# Patient Record
Sex: MALE | Race: WHITE | NOT HISPANIC OR LATINO | Employment: FULL TIME | URBAN - METROPOLITAN AREA
[De-identification: names, ages, dates, MRNs, and addresses within clinical notes are randomized per-mention and may not be internally consistent; named-entity substitution may affect disease eponyms.]

---

## 2023-12-02 ENCOUNTER — APPOINTMENT (OUTPATIENT)
Dept: RADIOLOGY | Facility: HOSPITAL | Age: 43
End: 2023-12-02
Payer: COMMERCIAL

## 2023-12-02 ENCOUNTER — HOSPITAL ENCOUNTER (EMERGENCY)
Facility: HOSPITAL | Age: 43
Discharge: HOME | End: 2023-12-02
Payer: COMMERCIAL

## 2023-12-02 VITALS
BODY MASS INDEX: 35.78 KG/M2 | SYSTOLIC BLOOD PRESSURE: 171 MMHG | HEIGHT: 73 IN | HEART RATE: 99 BPM | OXYGEN SATURATION: 96 % | TEMPERATURE: 98.1 F | RESPIRATION RATE: 16 BRPM | WEIGHT: 270 LBS | DIASTOLIC BLOOD PRESSURE: 101 MMHG

## 2023-12-02 DIAGNOSIS — R10.31 GROIN PAIN, RIGHT: Primary | ICD-10-CM

## 2023-12-02 LAB
APPEARANCE UR: CLEAR
BILIRUB UR STRIP.AUTO-MCNC: NEGATIVE MG/DL
COLOR UR: ABNORMAL
GLUCOSE UR STRIP.AUTO-MCNC: ABNORMAL MG/DL
KETONES UR STRIP.AUTO-MCNC: NEGATIVE MG/DL
LEUKOCYTE ESTERASE UR QL STRIP.AUTO: NEGATIVE
NITRITE UR QL STRIP.AUTO: NEGATIVE
PH UR STRIP.AUTO: 5 [PH]
PROT UR STRIP.AUTO-MCNC: ABNORMAL MG/DL
RBC # UR STRIP.AUTO: NEGATIVE /UL
RBC #/AREA URNS AUTO: NORMAL /HPF
SP GR UR STRIP.AUTO: 1.03
UROBILINOGEN UR STRIP.AUTO-MCNC: <2 MG/DL
WBC #/AREA URNS AUTO: NORMAL /HPF

## 2023-12-02 PROCEDURE — 81001 URINALYSIS AUTO W/SCOPE: CPT | Performed by: NURSE PRACTITIONER

## 2023-12-02 PROCEDURE — 94760 N-INVAS EAR/PLS OXIMETRY 1: CPT

## 2023-12-02 PROCEDURE — 93971 EXTREMITY STUDY: CPT | Mod: FR

## 2023-12-02 PROCEDURE — 99284 EMERGENCY DEPT VISIT MOD MDM: CPT | Mod: 25 | Performed by: NURSE PRACTITIONER

## 2023-12-02 PROCEDURE — 73502 X-RAY EXAM HIP UNI 2-3 VIEWS: CPT | Mod: RIGHT SIDE | Performed by: RADIOLOGY

## 2023-12-02 PROCEDURE — 73502 X-RAY EXAM HIP UNI 2-3 VIEWS: CPT | Mod: RT,FY,FR

## 2023-12-02 PROCEDURE — 93971 EXTREMITY STUDY: CPT | Mod: FOREIGN READ | Performed by: RADIOLOGY

## 2023-12-02 PROCEDURE — 99283 EMERGENCY DEPT VISIT LOW MDM: CPT

## 2023-12-02 RX ORDER — IBUPROFEN 600 MG/1
600 TABLET ORAL EVERY 8 HOURS PRN
Qty: 30 TABLET | Refills: 0 | Status: SHIPPED | OUTPATIENT
Start: 2023-12-02

## 2023-12-02 ASSESSMENT — COLUMBIA-SUICIDE SEVERITY RATING SCALE - C-SSRS
2. HAVE YOU ACTUALLY HAD ANY THOUGHTS OF KILLING YOURSELF?: NO
6. HAVE YOU EVER DONE ANYTHING, STARTED TO DO ANYTHING, OR PREPARED TO DO ANYTHING TO END YOUR LIFE?: NO
1. IN THE PAST MONTH, HAVE YOU WISHED YOU WERE DEAD OR WISHED YOU COULD GO TO SLEEP AND NOT WAKE UP?: NO

## 2023-12-02 ASSESSMENT — PAIN DESCRIPTION - PAIN TYPE: TYPE: ACUTE PAIN

## 2023-12-02 ASSESSMENT — PAIN DESCRIPTION - LOCATION: LOCATION: LEG

## 2023-12-02 ASSESSMENT — PAIN DESCRIPTION - DIRECTION: RADIATING_TOWARDS: GROIN

## 2023-12-02 ASSESSMENT — PAIN - FUNCTIONAL ASSESSMENT: PAIN_FUNCTIONAL_ASSESSMENT: 0-10

## 2023-12-02 ASSESSMENT — PAIN DESCRIPTION - ORIENTATION: ORIENTATION: RIGHT

## 2023-12-02 ASSESSMENT — PAIN DESCRIPTION - ONSET: ONSET: ONGOING

## 2023-12-02 ASSESSMENT — PAIN DESCRIPTION - DESCRIPTORS
DESCRIPTORS: SHARP;SHOOTING
DESCRIPTORS: SHARP

## 2023-12-02 ASSESSMENT — PAIN SCALES - GENERAL: PAINLEVEL_OUTOF10: 8

## 2023-12-02 ASSESSMENT — PAIN DESCRIPTION - FREQUENCY: FREQUENCY: INTERMITTENT

## 2023-12-02 NOTE — ED PROVIDER NOTES
HPI   Chief Complaint   Patient presents with    Leg Pain     Leg/groin pain       This is a 42-year-old  male presenting to the emergency room complaints of right groin pain for the past 3 days.  The patient denies any traumatic injury.  He states that the pain is worse when he is standing or sitting upright.  The pain is better when he is laying flat.  He has not noticed any obvious hernia or bulges in the groin region.  Patient is not having any urinary symptoms.  He denies any rashes or lesions.  He denies any history of known STD.  The patient does not have any history of PE, DVT, recent travel, recent hospitalization, cancer, or surgery.  The patient does not smoke.  He did not take any medication prior to arrival for pain.  He is not experiencing any paresthesias to the extremity.  He is not having any swelling of the leg.  No obvious wounds or infectious process.      History provided by:  Patient   used: No                        [place] Coma Scale Score: 15                  Patient History   Past Medical History:   Diagnosis Date    Personal history of other diseases of the circulatory system     History of hypertension    Personal history of other endocrine, nutritional and metabolic disease     History of hyperlipidemia    Personal history of other endocrine, nutritional and metabolic disease     History of type 2 diabetes mellitus     Past Surgical History:   Procedure Laterality Date    OTHER SURGICAL HISTORY  04/06/2017    Lung Lobectomy Partial     No family history on file.  Social History     Tobacco Use    Smoking status: Not on file    Smokeless tobacco: Not on file   Substance Use Topics    Alcohol use: Not on file    Drug use: Not on file       Physical Exam   ED Triage Vitals [12/02/23 1708]   Temp Heart Rate Resp BP   36.7 °C (98.1 °F) 80 16 --      SpO2 Temp Source Heart Rate Source Patient Position   100 % Tympanic Monitor --      BP Location FiO2 (%)     -- --        Physical Exam  Vitals and nursing note reviewed.   Constitutional:       Appearance: He is normal weight.   HENT:      Head: Normocephalic.      Right Ear: External ear normal.      Left Ear: External ear normal.      Nose: Nose normal.      Mouth/Throat:      Pharynx: Oropharynx is clear.   Eyes:      Conjunctiva/sclera: Conjunctivae normal.   Cardiovascular:      Rate and Rhythm: Normal rate and regular rhythm.      Pulses: Normal pulses.      Heart sounds: Normal heart sounds.   Pulmonary:      Effort: Pulmonary effort is normal.      Breath sounds: Normal breath sounds.   Abdominal:      General: Bowel sounds are normal. There is no distension.      Palpations: Abdomen is soft.      Tenderness: There is no abdominal tenderness. There is no right CVA tenderness, left CVA tenderness or rebound.   Genitourinary:     Comments: The patient has no rashes, lesions, ulcerations noted to the external genitalia.  The patient is a cremasterics male with no obvious palpable bulge in the right inguinal canal.  Musculoskeletal:      Comments: The patient is able to move all extremities with normal range of motion and muscle strength.  The patient does not have any reproducible pain with the hip joint.  The leg is not shortened or rotated.  Distal extremities with brisk cap refill and sensation intact.  No calf pain.   Neurological:      Mental Status: He is alert.         ED Course & MDM   Diagnoses as of 12/03/23 0020   Groin pain, right       Medical Decision Making  Patient was seen and evaluated by the nurse practitioner, Maliha Arredondo.  The patient is presenting to the emergency room with complaints of right groin pain.  The patient was examined and does not have any obvious inguinal defect.  He does not have any obvious signs of trauma to the right hip.  There is no shortening or rotation of the leg.  The patient is able to ambulate.  He is able to move the leg without difficulties.  An x-ray of the hip was  obtained and showed degenerative changes with no acute fracture or subluxation.  An ultrasound of the right lower extremity was performed and was negative for DVT.  Routine urinalysis was obtained and was negative for infection.  We believe the patient is most likely suffering from a groin strain.  The patient was provided prescription for ibuprofen for home administration.  He was educated on rice therapy.  The patient is to follow up with their primary care physician in the next 2-3 days.  The patient is to return to the ED worse in any way.  The patient was discharged in stable condition with computer discharge instructions given. Patient was agreeable with discharge planning.        Procedure  Procedures     KOFI Ramirez-TALITA  12/03/23 0022

## 2024-07-08 ENCOUNTER — HOSPITAL ENCOUNTER (OUTPATIENT)
Facility: HOSPITAL | Age: 44
Setting detail: OBSERVATION
Discharge: HOME | End: 2024-07-09
Attending: EMERGENCY MEDICINE | Admitting: STUDENT IN AN ORGANIZED HEALTH CARE EDUCATION/TRAINING PROGRAM
Payer: COMMERCIAL

## 2024-07-08 ENCOUNTER — APPOINTMENT (OUTPATIENT)
Dept: CARDIOLOGY | Facility: HOSPITAL | Age: 44
End: 2024-07-08
Payer: COMMERCIAL

## 2024-07-08 ENCOUNTER — APPOINTMENT (OUTPATIENT)
Dept: RADIOLOGY | Facility: HOSPITAL | Age: 44
End: 2024-07-08
Payer: COMMERCIAL

## 2024-07-08 DIAGNOSIS — K29.80 DUODENITIS: ICD-10-CM

## 2024-07-08 DIAGNOSIS — Z79.4 TYPE 2 DIABETES MELLITUS WITH DIABETIC NEUROPATHY, WITH LONG-TERM CURRENT USE OF INSULIN (MULTI): ICD-10-CM

## 2024-07-08 DIAGNOSIS — R73.9 HYPERGLYCEMIA: Primary | ICD-10-CM

## 2024-07-08 DIAGNOSIS — E11.40 TYPE 2 DIABETES MELLITUS WITH DIABETIC NEUROPATHY, WITH LONG-TERM CURRENT USE OF INSULIN (MULTI): ICD-10-CM

## 2024-07-08 LAB
ALBUMIN SERPL BCP-MCNC: 4 G/DL (ref 3.4–5)
ALP SERPL-CCNC: 112 U/L (ref 33–120)
ALT SERPL W P-5'-P-CCNC: 11 U/L (ref 10–52)
ANION GAP SERPL CALC-SCNC: 13 MMOL/L (ref 10–20)
APPEARANCE UR: CLEAR
AST SERPL W P-5'-P-CCNC: 9 U/L (ref 9–39)
BASOPHILS # BLD AUTO: 0.03 X10*3/UL (ref 0–0.1)
BASOPHILS NFR BLD AUTO: 0.4 %
BILIRUB SERPL-MCNC: 0.4 MG/DL (ref 0–1.2)
BILIRUB UR STRIP.AUTO-MCNC: NEGATIVE MG/DL
BUN SERPL-MCNC: 14 MG/DL (ref 6–23)
CALCIUM SERPL-MCNC: 9.1 MG/DL (ref 8.6–10.3)
CARDIAC TROPONIN I PNL SERPL HS: 5 NG/L (ref 0–20)
CHLORIDE SERPL-SCNC: 93 MMOL/L (ref 98–107)
CO2 SERPL-SCNC: 26 MMOL/L (ref 21–32)
COLOR UR: COLORLESS
CREAT SERPL-MCNC: 1.07 MG/DL (ref 0.5–1.3)
EGFRCR SERPLBLD CKD-EPI 2021: 88 ML/MIN/1.73M*2
EOSINOPHIL # BLD AUTO: 0.04 X10*3/UL (ref 0–0.7)
EOSINOPHIL NFR BLD AUTO: 0.5 %
ERYTHROCYTE [DISTWIDTH] IN BLOOD BY AUTOMATED COUNT: 12.3 % (ref 11.5–14.5)
GLUCOSE BLD MANUAL STRIP-MCNC: 375 MG/DL (ref 74–99)
GLUCOSE SERPL-MCNC: 474 MG/DL (ref 74–99)
GLUCOSE UR STRIP.AUTO-MCNC: ABNORMAL MG/DL
HCT VFR BLD AUTO: 41 % (ref 41–52)
HGB BLD-MCNC: 14.6 G/DL (ref 13.5–17.5)
IMM GRANULOCYTES # BLD AUTO: 0.02 X10*3/UL (ref 0–0.7)
IMM GRANULOCYTES NFR BLD AUTO: 0.3 % (ref 0–0.9)
INR PPP: 1 (ref 0.9–1.1)
KETONES UR STRIP.AUTO-MCNC: NEGATIVE MG/DL
LEUKOCYTE ESTERASE UR QL STRIP.AUTO: NEGATIVE
LIPASE SERPL-CCNC: 63 U/L (ref 9–82)
LYMPHOCYTES # BLD AUTO: 1.51 X10*3/UL (ref 1.2–4.8)
LYMPHOCYTES NFR BLD AUTO: 19.5 %
MCH RBC QN AUTO: 28.1 PG (ref 26–34)
MCHC RBC AUTO-ENTMCNC: 35.6 G/DL (ref 32–36)
MCV RBC AUTO: 79 FL (ref 80–100)
MONOCYTES # BLD AUTO: 0.43 X10*3/UL (ref 0.1–1)
MONOCYTES NFR BLD AUTO: 5.6 %
NEUTROPHILS # BLD AUTO: 5.7 X10*3/UL (ref 1.2–7.7)
NEUTROPHILS NFR BLD AUTO: 73.7 %
NITRITE UR QL STRIP.AUTO: NEGATIVE
NRBC BLD-RTO: 0 /100 WBCS (ref 0–0)
PH UR STRIP.AUTO: 5.5 [PH]
PLATELET # BLD AUTO: 342 X10*3/UL (ref 150–450)
POTASSIUM SERPL-SCNC: 4.3 MMOL/L (ref 3.5–5.3)
PROT SERPL-MCNC: 6.6 G/DL (ref 6.4–8.2)
PROT UR STRIP.AUTO-MCNC: ABNORMAL MG/DL
PROTHROMBIN TIME: 11 SECONDS (ref 9.8–12.8)
RBC # BLD AUTO: 5.19 X10*6/UL (ref 4.5–5.9)
RBC # UR STRIP.AUTO: NEGATIVE /UL
RBC #/AREA URNS AUTO: NORMAL /HPF
SODIUM SERPL-SCNC: 128 MMOL/L (ref 136–145)
SP GR UR STRIP.AUTO: 1.02
UROBILINOGEN UR STRIP.AUTO-MCNC: NORMAL MG/DL
WBC # BLD AUTO: 7.7 X10*3/UL (ref 4.4–11.3)
WBC #/AREA URNS AUTO: NORMAL /HPF

## 2024-07-08 PROCEDURE — 96375 TX/PRO/DX INJ NEW DRUG ADDON: CPT

## 2024-07-08 PROCEDURE — 76870 US EXAM SCROTUM: CPT

## 2024-07-08 PROCEDURE — 83690 ASSAY OF LIPASE: CPT | Performed by: EMERGENCY MEDICINE

## 2024-07-08 PROCEDURE — 76870 US EXAM SCROTUM: CPT | Performed by: STUDENT IN AN ORGANIZED HEALTH CARE EDUCATION/TRAINING PROGRAM

## 2024-07-08 PROCEDURE — 96361 HYDRATE IV INFUSION ADD-ON: CPT

## 2024-07-08 PROCEDURE — 81001 URINALYSIS AUTO W/SCOPE: CPT | Performed by: EMERGENCY MEDICINE

## 2024-07-08 PROCEDURE — 83036 HEMOGLOBIN GLYCOSYLATED A1C: CPT | Performed by: STUDENT IN AN ORGANIZED HEALTH CARE EDUCATION/TRAINING PROGRAM

## 2024-07-08 PROCEDURE — 93005 ELECTROCARDIOGRAM TRACING: CPT

## 2024-07-08 PROCEDURE — 2500000004 HC RX 250 GENERAL PHARMACY W/ HCPCS (ALT 636 FOR OP/ED): Performed by: EMERGENCY MEDICINE

## 2024-07-08 PROCEDURE — 36415 COLL VENOUS BLD VENIPUNCTURE: CPT | Performed by: EMERGENCY MEDICINE

## 2024-07-08 PROCEDURE — 96374 THER/PROPH/DIAG INJ IV PUSH: CPT

## 2024-07-08 PROCEDURE — 99285 EMERGENCY DEPT VISIT HI MDM: CPT | Mod: 25

## 2024-07-08 PROCEDURE — 82947 ASSAY GLUCOSE BLOOD QUANT: CPT | Mod: 59

## 2024-07-08 PROCEDURE — 85610 PROTHROMBIN TIME: CPT | Performed by: EMERGENCY MEDICINE

## 2024-07-08 PROCEDURE — 85025 COMPLETE CBC W/AUTO DIFF WBC: CPT | Performed by: EMERGENCY MEDICINE

## 2024-07-08 PROCEDURE — 84484 ASSAY OF TROPONIN QUANT: CPT | Performed by: EMERGENCY MEDICINE

## 2024-07-08 PROCEDURE — 80053 COMPREHEN METABOLIC PANEL: CPT | Performed by: EMERGENCY MEDICINE

## 2024-07-08 RX ORDER — ONDANSETRON HYDROCHLORIDE 2 MG/ML
4 INJECTION, SOLUTION INTRAVENOUS ONCE
Status: COMPLETED | OUTPATIENT
Start: 2024-07-08 | End: 2024-07-08

## 2024-07-08 RX ORDER — MORPHINE SULFATE 4 MG/ML
4 INJECTION INTRAVENOUS ONCE
Status: COMPLETED | OUTPATIENT
Start: 2024-07-08 | End: 2024-07-08

## 2024-07-08 ASSESSMENT — COLUMBIA-SUICIDE SEVERITY RATING SCALE - C-SSRS
6. HAVE YOU EVER DONE ANYTHING, STARTED TO DO ANYTHING, OR PREPARED TO DO ANYTHING TO END YOUR LIFE?: NO
2. HAVE YOU ACTUALLY HAD ANY THOUGHTS OF KILLING YOURSELF?: NO
1. IN THE PAST MONTH, HAVE YOU WISHED YOU WERE DEAD OR WISHED YOU COULD GO TO SLEEP AND NOT WAKE UP?: NO

## 2024-07-09 ENCOUNTER — PHARMACY VISIT (OUTPATIENT)
Dept: PHARMACY | Facility: CLINIC | Age: 44
End: 2024-07-09
Payer: COMMERCIAL

## 2024-07-09 ENCOUNTER — APPOINTMENT (OUTPATIENT)
Dept: RADIOLOGY | Facility: HOSPITAL | Age: 44
End: 2024-07-09
Payer: COMMERCIAL

## 2024-07-09 VITALS
WEIGHT: 217 LBS | OXYGEN SATURATION: 97 % | HEART RATE: 92 BPM | BODY MASS INDEX: 27.85 KG/M2 | TEMPERATURE: 97.8 F | DIASTOLIC BLOOD PRESSURE: 89 MMHG | RESPIRATION RATE: 18 BRPM | SYSTOLIC BLOOD PRESSURE: 159 MMHG | HEIGHT: 74 IN

## 2024-07-09 PROBLEM — N50.819 TESTICULAR PAIN: Status: ACTIVE | Noted: 2024-07-09

## 2024-07-09 PROBLEM — R00.0 TACHYCARDIA: Status: RESOLVED | Noted: 2024-07-09 | Resolved: 2024-07-09

## 2024-07-09 PROBLEM — R00.0 TACHYCARDIA: Status: ACTIVE | Noted: 2024-07-09

## 2024-07-09 PROBLEM — E78.5 HYPERLIPIDEMIA: Status: ACTIVE | Noted: 2018-05-20

## 2024-07-09 PROBLEM — K29.80 DUODENITIS: Status: ACTIVE | Noted: 2024-07-09

## 2024-07-09 PROBLEM — E87.1 HYPONATREMIA: Status: ACTIVE | Noted: 2024-07-09

## 2024-07-09 PROBLEM — K29.80 DUODENITIS: Status: RESOLVED | Noted: 2024-07-09 | Resolved: 2024-07-09

## 2024-07-09 PROBLEM — R73.9 HYPERGLYCEMIA: Status: ACTIVE | Noted: 2024-07-09

## 2024-07-09 PROBLEM — E11.40 TYPE 2 DIABETES MELLITUS WITH DIABETIC NEUROPATHY (MULTI): Status: ACTIVE | Noted: 2018-05-20

## 2024-07-09 PROBLEM — E87.1 HYPONATREMIA: Status: RESOLVED | Noted: 2024-07-09 | Resolved: 2024-07-09

## 2024-07-09 PROBLEM — N50.819 TESTICULAR PAIN: Status: RESOLVED | Noted: 2024-07-09 | Resolved: 2024-07-09

## 2024-07-09 PROBLEM — I10 BENIGN ESSENTIAL HYPERTENSION: Status: ACTIVE | Noted: 2018-05-20

## 2024-07-09 LAB
ANION GAP SERPL CALC-SCNC: 11 MMOL/L (ref 10–20)
BUN SERPL-MCNC: 12 MG/DL (ref 6–23)
CALCIUM SERPL-MCNC: 8.4 MG/DL (ref 8.6–10.3)
CARDIAC TROPONIN I PNL SERPL HS: 5 NG/L (ref 0–20)
CHLORIDE SERPL-SCNC: 98 MMOL/L (ref 98–107)
CO2 SERPL-SCNC: 27 MMOL/L (ref 21–32)
CREAT SERPL-MCNC: 0.79 MG/DL (ref 0.5–1.3)
EGFRCR SERPLBLD CKD-EPI 2021: >90 ML/MIN/1.73M*2
ERYTHROCYTE [DISTWIDTH] IN BLOOD BY AUTOMATED COUNT: 12.5 % (ref 11.5–14.5)
EST. AVERAGE GLUCOSE BLD GHB EST-MCNC: 355 MG/DL
GLUCOSE BLD MANUAL STRIP-MCNC: 262 MG/DL (ref 74–99)
GLUCOSE BLD MANUAL STRIP-MCNC: 283 MG/DL (ref 74–99)
GLUCOSE BLD MANUAL STRIP-MCNC: 297 MG/DL (ref 74–99)
GLUCOSE SERPL-MCNC: 285 MG/DL (ref 74–99)
HBA1C MFR BLD: 14 %
HCT VFR BLD AUTO: 38.6 % (ref 41–52)
HGB BLD-MCNC: 13.4 G/DL (ref 13.5–17.5)
MCH RBC QN AUTO: 27.7 PG (ref 26–34)
MCHC RBC AUTO-ENTMCNC: 34.7 G/DL (ref 32–36)
MCV RBC AUTO: 80 FL (ref 80–100)
NRBC BLD-RTO: 0 /100 WBCS (ref 0–0)
PLATELET # BLD AUTO: 312 X10*3/UL (ref 150–450)
POTASSIUM SERPL-SCNC: 3.7 MMOL/L (ref 3.5–5.3)
RBC # BLD AUTO: 4.84 X10*6/UL (ref 4.5–5.9)
SODIUM SERPL-SCNC: 132 MMOL/L (ref 136–145)
WBC # BLD AUTO: 5.8 X10*3/UL (ref 4.4–11.3)

## 2024-07-09 PROCEDURE — 82947 ASSAY GLUCOSE BLOOD QUANT: CPT

## 2024-07-09 PROCEDURE — 74174 CTA ABD&PLVS W/CONTRAST: CPT | Performed by: RADIOLOGY

## 2024-07-09 PROCEDURE — 1100000001 HC PRIVATE ROOM DAILY

## 2024-07-09 PROCEDURE — 2500000004 HC RX 250 GENERAL PHARMACY W/ HCPCS (ALT 636 FOR OP/ED): Performed by: STUDENT IN AN ORGANIZED HEALTH CARE EDUCATION/TRAINING PROGRAM

## 2024-07-09 PROCEDURE — 85027 COMPLETE CBC AUTOMATED: CPT | Performed by: STUDENT IN AN ORGANIZED HEALTH CARE EDUCATION/TRAINING PROGRAM

## 2024-07-09 PROCEDURE — 96361 HYDRATE IV INFUSION ADD-ON: CPT

## 2024-07-09 PROCEDURE — 36415 COLL VENOUS BLD VENIPUNCTURE: CPT | Performed by: STUDENT IN AN ORGANIZED HEALTH CARE EDUCATION/TRAINING PROGRAM

## 2024-07-09 PROCEDURE — 99239 HOSP IP/OBS DSCHRG MGMT >30: CPT | Performed by: INTERNAL MEDICINE

## 2024-07-09 PROCEDURE — 99223 1ST HOSP IP/OBS HIGH 75: CPT | Performed by: STUDENT IN AN ORGANIZED HEALTH CARE EDUCATION/TRAINING PROGRAM

## 2024-07-09 PROCEDURE — G0378 HOSPITAL OBSERVATION PER HR: HCPCS

## 2024-07-09 PROCEDURE — 71275 CT ANGIOGRAPHY CHEST: CPT

## 2024-07-09 PROCEDURE — 71275 CT ANGIOGRAPHY CHEST: CPT | Performed by: RADIOLOGY

## 2024-07-09 PROCEDURE — 96375 TX/PRO/DX INJ NEW DRUG ADDON: CPT

## 2024-07-09 PROCEDURE — 96374 THER/PROPH/DIAG INJ IV PUSH: CPT | Mod: 59

## 2024-07-09 PROCEDURE — 2500000001 HC RX 250 WO HCPCS SELF ADMINISTERED DRUGS (ALT 637 FOR MEDICARE OP): Performed by: STUDENT IN AN ORGANIZED HEALTH CARE EDUCATION/TRAINING PROGRAM

## 2024-07-09 PROCEDURE — 2500000004 HC RX 250 GENERAL PHARMACY W/ HCPCS (ALT 636 FOR OP/ED): Performed by: EMERGENCY MEDICINE

## 2024-07-09 PROCEDURE — 80048 BASIC METABOLIC PNL TOTAL CA: CPT | Performed by: STUDENT IN AN ORGANIZED HEALTH CARE EDUCATION/TRAINING PROGRAM

## 2024-07-09 PROCEDURE — RXMED WILLOW AMBULATORY MEDICATION CHARGE

## 2024-07-09 PROCEDURE — 2550000001 HC RX 255 CONTRASTS: Performed by: EMERGENCY MEDICINE

## 2024-07-09 PROCEDURE — 96375 TX/PRO/DX INJ NEW DRUG ADDON: CPT | Mod: 59

## 2024-07-09 RX ORDER — PANTOPRAZOLE SODIUM 40 MG/1
40 TABLET, DELAYED RELEASE ORAL
Status: DISCONTINUED | OUTPATIENT
Start: 2024-07-09 | End: 2024-07-09 | Stop reason: HOSPADM

## 2024-07-09 RX ORDER — BISACODYL 5 MG
10 TABLET, DELAYED RELEASE (ENTERIC COATED) ORAL DAILY PRN
Status: DISCONTINUED | OUTPATIENT
Start: 2024-07-09 | End: 2024-07-09 | Stop reason: HOSPADM

## 2024-07-09 RX ORDER — DEXTROSE 4 G
TABLET,CHEWABLE ORAL
Qty: 1 EACH | Refills: 0 | OUTPATIENT
Start: 2024-07-09

## 2024-07-09 RX ORDER — CEFDINIR 300 MG/1
300 CAPSULE ORAL 2 TIMES DAILY
Qty: 20 CAPSULE | Refills: 0 | Status: SHIPPED | OUTPATIENT
Start: 2024-07-09 | End: 2024-07-19

## 2024-07-09 RX ORDER — ONDANSETRON 4 MG/1
4 TABLET, FILM COATED ORAL EVERY 8 HOURS PRN
Status: DISCONTINUED | OUTPATIENT
Start: 2024-07-09 | End: 2024-07-09 | Stop reason: HOSPADM

## 2024-07-09 RX ORDER — INSULIN GLARGINE 100 [IU]/ML
20 INJECTION, SOLUTION SUBCUTANEOUS NIGHTLY
Status: DISCONTINUED | OUTPATIENT
Start: 2024-07-09 | End: 2024-07-09

## 2024-07-09 RX ORDER — DEXTROSE 50 % IN WATER (D50W) INTRAVENOUS SYRINGE
12.5
Status: DISCONTINUED | OUTPATIENT
Start: 2024-07-09 | End: 2024-07-09 | Stop reason: HOSPADM

## 2024-07-09 RX ORDER — LANCETS
EACH MISCELLANEOUS
Qty: 100 EACH | Refills: 1 | OUTPATIENT
Start: 2024-07-09

## 2024-07-09 RX ORDER — ONDANSETRON HYDROCHLORIDE 2 MG/ML
4 INJECTION, SOLUTION INTRAVENOUS ONCE
Status: COMPLETED | OUTPATIENT
Start: 2024-07-09 | End: 2024-07-09

## 2024-07-09 RX ORDER — METRONIDAZOLE 500 MG/1
500 TABLET ORAL 3 TIMES DAILY
Qty: 30 TABLET | Refills: 0 | Status: SHIPPED | OUTPATIENT
Start: 2024-07-09 | End: 2024-07-19

## 2024-07-09 RX ORDER — FAMOTIDINE 10 MG/ML
40 INJECTION INTRAVENOUS ONCE
Status: COMPLETED | OUTPATIENT
Start: 2024-07-09 | End: 2024-07-09

## 2024-07-09 RX ORDER — ISOPROPYL ALCOHOL 70 ML/100ML
SWAB TOPICAL
Qty: 100 EACH | Refills: 1 | OUTPATIENT
Start: 2024-07-09

## 2024-07-09 RX ORDER — INSULIN LISPRO 100 [IU]/ML
INJECTION, SOLUTION INTRAVENOUS; SUBCUTANEOUS
Qty: 9 ML | Refills: 11 | Status: SHIPPED | OUTPATIENT
Start: 2024-07-09

## 2024-07-09 RX ORDER — INSULIN LISPRO 100 [IU]/ML
0-5 INJECTION, SOLUTION INTRAVENOUS; SUBCUTANEOUS
Status: DISCONTINUED | OUTPATIENT
Start: 2024-07-09 | End: 2024-07-09 | Stop reason: HOSPADM

## 2024-07-09 RX ORDER — BISACODYL 10 MG/1
10 SUPPOSITORY RECTAL DAILY PRN
Status: DISCONTINUED | OUTPATIENT
Start: 2024-07-09 | End: 2024-07-09 | Stop reason: HOSPADM

## 2024-07-09 RX ORDER — DEXTROSE 50 % IN WATER (D50W) INTRAVENOUS SYRINGE
25
Status: DISCONTINUED | OUTPATIENT
Start: 2024-07-09 | End: 2024-07-09 | Stop reason: HOSPADM

## 2024-07-09 RX ORDER — INSULIN GLARGINE 100 [IU]/ML
30 INJECTION, SOLUTION SUBCUTANEOUS NIGHTLY
Status: DISCONTINUED | OUTPATIENT
Start: 2024-07-09 | End: 2024-07-09 | Stop reason: HOSPADM

## 2024-07-09 RX ORDER — TALC
3 POWDER (GRAM) TOPICAL NIGHTLY PRN
Status: DISCONTINUED | OUTPATIENT
Start: 2024-07-09 | End: 2024-07-09 | Stop reason: HOSPADM

## 2024-07-09 RX ORDER — LISINOPRIL 10 MG/1
10 TABLET ORAL DAILY
Status: DISCONTINUED | OUTPATIENT
Start: 2024-07-09 | End: 2024-07-09 | Stop reason: HOSPADM

## 2024-07-09 RX ORDER — KETOROLAC TROMETHAMINE 30 MG/ML
30 INJECTION, SOLUTION INTRAMUSCULAR; INTRAVENOUS EVERY 6 HOURS PRN
Status: DISCONTINUED | OUTPATIENT
Start: 2024-07-09 | End: 2024-07-09 | Stop reason: HOSPADM

## 2024-07-09 RX ORDER — ATORVASTATIN CALCIUM 40 MG/1
80 TABLET, FILM COATED ORAL NIGHTLY
Status: DISCONTINUED | OUTPATIENT
Start: 2024-07-09 | End: 2024-07-09 | Stop reason: HOSPADM

## 2024-07-09 RX ORDER — METFORMIN HYDROCHLORIDE 1000 MG/1
1000 TABLET ORAL
Qty: 30 TABLET | Refills: 11 | Status: SHIPPED | OUTPATIENT
Start: 2024-07-09 | End: 2025-07-09

## 2024-07-09 RX ORDER — LABETALOL HYDROCHLORIDE 5 MG/ML
10 INJECTION, SOLUTION INTRAVENOUS EVERY 4 HOURS PRN
Status: DISCONTINUED | OUTPATIENT
Start: 2024-07-09 | End: 2024-07-09 | Stop reason: HOSPADM

## 2024-07-09 RX ORDER — PEN NEEDLE, DIABETIC 30 GX3/16"
NEEDLE, DISPOSABLE MISCELLANEOUS
Qty: 100 EACH | Refills: 1 | OUTPATIENT
Start: 2024-07-09

## 2024-07-09 RX ORDER — INSULIN GLARGINE 100 [IU]/ML
30 INJECTION, SOLUTION SUBCUTANEOUS NIGHTLY
Qty: 9 ML | Refills: 11 | Status: SHIPPED | OUTPATIENT
Start: 2024-07-09 | End: 2025-07-09

## 2024-07-09 RX ORDER — ONDANSETRON HYDROCHLORIDE 2 MG/ML
4 INJECTION, SOLUTION INTRAVENOUS EVERY 8 HOURS PRN
Status: DISCONTINUED | OUTPATIENT
Start: 2024-07-09 | End: 2024-07-09 | Stop reason: HOSPADM

## 2024-07-09 RX ORDER — PANTOPRAZOLE SODIUM 40 MG/10ML
40 INJECTION, POWDER, LYOPHILIZED, FOR SOLUTION INTRAVENOUS
Status: DISCONTINUED | OUTPATIENT
Start: 2024-07-09 | End: 2024-07-09 | Stop reason: HOSPADM

## 2024-07-09 RX ORDER — GUAIFENESIN 600 MG/1
600 TABLET, EXTENDED RELEASE ORAL EVERY 12 HOURS PRN
Status: DISCONTINUED | OUTPATIENT
Start: 2024-07-09 | End: 2024-07-09 | Stop reason: HOSPADM

## 2024-07-09 SDOH — SOCIAL STABILITY: SOCIAL INSECURITY: HAS ANYONE EVER THREATENED TO HURT YOUR FAMILY OR YOUR PETS?: NO

## 2024-07-09 SDOH — SOCIAL STABILITY: SOCIAL INSECURITY: DO YOU FEEL UNSAFE GOING BACK TO THE PLACE WHERE YOU ARE LIVING?: NO

## 2024-07-09 SDOH — SOCIAL STABILITY: SOCIAL INSECURITY: HAVE YOU HAD THOUGHTS OF HARMING ANYONE ELSE?: NO

## 2024-07-09 SDOH — SOCIAL STABILITY: SOCIAL INSECURITY: ABUSE: ADULT

## 2024-07-09 SDOH — SOCIAL STABILITY: SOCIAL INSECURITY: WERE YOU ABLE TO COMPLETE ALL THE BEHAVIORAL HEALTH SCREENINGS?: YES

## 2024-07-09 SDOH — SOCIAL STABILITY: SOCIAL INSECURITY: HAVE YOU HAD ANY THOUGHTS OF HARMING ANYONE ELSE?: NO

## 2024-07-09 SDOH — SOCIAL STABILITY: SOCIAL INSECURITY: ARE YOU OR HAVE YOU BEEN THREATENED OR ABUSED PHYSICALLY, EMOTIONALLY, OR SEXUALLY BY ANYONE?: NO

## 2024-07-09 SDOH — SOCIAL STABILITY: SOCIAL INSECURITY: DO YOU FEEL ANYONE HAS EXPLOITED OR TAKEN ADVANTAGE OF YOU FINANCIALLY OR OF YOUR PERSONAL PROPERTY?: NO

## 2024-07-09 SDOH — SOCIAL STABILITY: SOCIAL INSECURITY: DOES ANYONE TRY TO KEEP YOU FROM HAVING/CONTACTING OTHER FRIENDS OR DOING THINGS OUTSIDE YOUR HOME?: NO

## 2024-07-09 SDOH — SOCIAL STABILITY: SOCIAL INSECURITY: ARE THERE ANY APPARENT SIGNS OF INJURIES/BEHAVIORS THAT COULD BE RELATED TO ABUSE/NEGLECT?: NO

## 2024-07-09 ASSESSMENT — ENCOUNTER SYMPTOMS
NUMBNESS: 0
DIARRHEA: 0
CONSTIPATION: 0
MYALGIAS: 0
FATIGUE: 0
DECREASED CONCENTRATION: 0
CONFUSION: 0
CHEST TIGHTNESS: 0
WHEEZING: 0
DIFFICULTY URINATING: 1
FLANK PAIN: 0
SINUS PAIN: 0
DIZZINESS: 0
SORE THROAT: 0
COUGH: 0
WOUND: 0
FREQUENCY: 1
ABDOMINAL PAIN: 0
ARTHRALGIAS: 0
ABDOMINAL DISTENTION: 0
SHORTNESS OF BREATH: 1
LIGHT-HEADEDNESS: 1
JOINT SWELLING: 0
WEAKNESS: 0
COLOR CHANGE: 0
DIAPHORESIS: 0
HEADACHES: 0
BACK PAIN: 1
APNEA: 0
FEVER: 0
CHILLS: 0
NERVOUS/ANXIOUS: 0
VOMITING: 0
DYSURIA: 1
APPETITE CHANGE: 0
RHINORRHEA: 0
NAUSEA: 0
STRIDOR: 0
HEMATURIA: 0
ACTIVITY CHANGE: 0
AGITATION: 0
PALPITATIONS: 0

## 2024-07-09 ASSESSMENT — COGNITIVE AND FUNCTIONAL STATUS - GENERAL
PATIENT BASELINE BEDBOUND: NO
MOBILITY SCORE: 24
DAILY ACTIVITIY SCORE: 24
MOBILITY SCORE: 24
DAILY ACTIVITIY SCORE: 24

## 2024-07-09 ASSESSMENT — ACTIVITIES OF DAILY LIVING (ADL)
LACK_OF_TRANSPORTATION: NO
ASSISTIVE_DEVICE: EYEGLASSES
FEEDING YOURSELF: INDEPENDENT
WALKS IN HOME: INDEPENDENT
DRESSING YOURSELF: INDEPENDENT
HEARING - LEFT EAR: FUNCTIONAL
ADEQUATE_TO_COMPLETE_ADL: YES
TOILETING: INDEPENDENT
JUDGMENT_ADEQUATE_SAFELY_COMPLETE_DAILY_ACTIVITIES: YES
PATIENT'S MEMORY ADEQUATE TO SAFELY COMPLETE DAILY ACTIVITIES?: YES
BATHING: INDEPENDENT
GROOMING: INDEPENDENT
HEARING - RIGHT EAR: FUNCTIONAL

## 2024-07-09 ASSESSMENT — PAIN - FUNCTIONAL ASSESSMENT
PAIN_FUNCTIONAL_ASSESSMENT: 0-10

## 2024-07-09 ASSESSMENT — LIFESTYLE VARIABLES
HOW OFTEN DO YOU HAVE 6 OR MORE DRINKS ON ONE OCCASION: NEVER
AUDIT-C TOTAL SCORE: 0
SKIP TO QUESTIONS 9-10: 1
HOW MANY STANDARD DRINKS CONTAINING ALCOHOL DO YOU HAVE ON A TYPICAL DAY: PATIENT DOES NOT DRINK
AUDIT-C TOTAL SCORE: 0
HOW OFTEN DO YOU HAVE A DRINK CONTAINING ALCOHOL: NEVER

## 2024-07-09 ASSESSMENT — PATIENT HEALTH QUESTIONNAIRE - PHQ9
2. FEELING DOWN, DEPRESSED OR HOPELESS: NOT AT ALL
SUM OF ALL RESPONSES TO PHQ9 QUESTIONS 1 & 2: 0
1. LITTLE INTEREST OR PLEASURE IN DOING THINGS: NOT AT ALL

## 2024-07-09 ASSESSMENT — PAIN DESCRIPTION - LOCATION: LOCATION: SCROTUM

## 2024-07-09 ASSESSMENT — PAIN SCALES - GENERAL
PAINLEVEL_OUTOF10: 4
PAINLEVEL_OUTOF10: 6
PAINLEVEL_OUTOF10: 0 - NO PAIN

## 2024-07-09 NOTE — CARE PLAN
Problem: Fall/Injury  Goal: Not fall by end of shift  Outcome: Progressing  Goal: Be free from injury by end of the shift  Outcome: Progressing  Goal: Verbalize understanding of personal risk factors for fall in the hospital  Outcome: Progressing  Goal: Verbalize understanding of risk factor reduction measures to prevent injury from fall in the home  Outcome: Progressing  Goal: Use assistive devices by end of the shift  Outcome: Progressing  Goal: Pace activities to prevent fatigue by end of the shift  Outcome: Progressing   The patient's goals for the shift include  pt will remain safe from injury    The clinical goals for the shift include Pt will remain safe from injury

## 2024-07-09 NOTE — PROGRESS NOTES
07/09/24 0854   Discharge Planning   Living Arrangements Spouse/significant other   Support Systems Spouse/significant other   Assistance Needed none   Type of Residence Private residence   Home or Post Acute Services None   Patient expects to be discharged to: home   Does the patient need discharge transport arranged? No   Housing Stability   In the last 12 months, was there a time when you were not able to pay the mortgage or rent on time? N   Transportation Needs   In the past 12 months, has lack of transportation kept you from medical appointments or from getting medications? no     I spoke with patient to introduce discharge planning.   Pt states he is independent at home where he lives with his wife.  He works FT.  He states it's been about 5 years since he last saw his PCP, Dr. Oro and about 3 years since seeing his endocrinologist.  He does utilize [employer] system for glucose monitoring.  Although he has not been to endo for this length of time, he says he has had a lot of insulin at home.  He admits that, even with the sliding scale, he was not sure of appropriate insulin admin as his sugars would be very high and very low and eventually just quit. This [facility] offered to assist with scheduling him appts but he said he can do it.  Pt is planning return home on discharge.  Will follow for needs.

## 2024-07-09 NOTE — DISCHARGE SUMMARY
DISCHARGE SUMMARY     Discharge Diagnosis  Hyperglycemia    This discharge took greater than 35 minutes.    Test Results Pending At Discharge  Pending Labs       Order Current Status    Hemoglobin A1c In process            Hospital Course   Davon Troncoso is a 43 y.o. male with PMHx s/f poorly controlled DM2, HTN, HLD, obesity, Partial lung lobectomy 2017, L testicular prosthesis presenting with R testicular pain. Pt states for the last 24 hours he has been having R stabbing testicular pain that has been radiating into his thoracic back and chest. He is having some lightheadedness as well. He says the pain is so severe he is having shortness of breath. At one point the pain made him almost fall. He does admit to recent urinary frequency, hesitancy, and burning over the last week. He used to follow with endocrinology for his DM2, but stopped his meds of his own accord roughly three years ago. He has not been taking any prescription medications since then. He is supposed to be on Humalog, Lantus, a statin, lisinopril, and metformin. No vomiting, cough, sputum production, fever, chills, syncope, or leg swelling. Overall he says his testicular pain has improved as his hyperglycemia has been treated in the ER and now he feels better.     ED Course (Summary):   Vitals on presentation: 97.5 F, 107 bpm, 16 rr, 139/83, 98% on RA  Labs: CMP glu 474, Na 128, Cl 93  Lipase 63  Trop 5 --> 5  INR 1.0  CBC unremarkable  UA 1+ protein, 4+ glucose  Imaging: US scrotum - No acute sonographic abnormality.   Avascular structure with low-level echoes in the expected location of   the left testicle corresponding to reported left testicular   prosthesis.   CTA c/a/p - No aortic dissection or aneurysm.   2. Mild wall thickening of the distal duodenum and proximal jejunum   concerning for duodenitis/enteritis. Mildly prominent left lower   quadrant mesenteric lymph nodes, likely reactive.   3. Mildly prominent bilateral hilar lymph nodes,  likely reactive.   4. Ovoid 2.1 cm structure within the subcutaneous left scrotum,   possibly testicular prosthesis, less likely epidermal inclusion cyst.   Scrotum is not completely included in the field of view.   5. Hepatomegaly.   6. Atherosclerosis (including multi-vessel coronary atherosclerotic   calcification), greater than expected for patient age.   EKG - Sinus rhythm at 98 bpm  Interventions: Pepcid 40 mg X1, LR 2 L bolus, morphine 4 mg X1, Zofran 4 mg X2, Admission for hyperglycemia    Uncontrolled DM2, hyperglycemia:  A1c pending  We currently have no Endocrinology coverage  Resume basal bolus insulin   Resume Metformin   Endo referral     Testicular pain:  Unclear etiology, but appears to have improved with ER treatment.  Imaging negative for acute findings.     Duodenitis seen on CT a/p:  No N/V or diarrhea.  Unclear if this was presenting as referred pain to the scrotum. Will treat with abx out of an abundance of caution   -Start cefdinir/Flagyl for 10 d    Pertinent Physical Exam At Time of Discharge  Constitutional: Pleasant and cooperative. Laying in bed in no acute distress. Conversant.   Skin: Warm and dry; no obvious lesions, rashes, pallor, or jaundice. Good turgor.   Eyes: EOMI. Anicteric sclera.   ENT: Mucous membranes moist; no obvious injury or deformity appreciated.   Head and Neck: Normocephalic, atraumatic. ROM preserved. Trachea midline. No appreciable JVD.   Respiratory: Nonlabored on RA. Lungs clear to auscultation bilaterally without obvious adventitious sounds. Chest rise is equal.  Cardiovascular: RRR. No gross murmur, gallop, or rub. Extremities are warm and well-perfused with good capillary refill (< 3 seconds). No chest wall tenderness.   GI: Abdomen soft, nontender, nondistended. No obvious organomegaly appreciated. Bowel sounds are present and normoactive.  : No CVA tenderness. Genital examined with chaperone. No rash, swelling, or urethral discharge noted. L testicular  prosthesis noted.  MSK: No gross abnormalities appreciated. No limitations to AROM/PROM appreciated.   Extremities: No cyanosis, edema, or clubbing evident. Neurovascularly intact.   Neuro: A&Ox3. CN 2-12 grossly intact. Able to respond to questions appropriately and clearly. No acute focal neurologic deficits appreciated.  Psych: Appropriate mood and behavior.    Home Medications     Medication List      ASK your doctor about these medications     ibuprofen 600 mg tablet; Take 1 tablet (600 mg) by mouth every 8 hours   if needed for mild pain (1 - 3) or fever (temp greater than 38.0 C).       Outpatient Follow-Up  No follow-ups on file.     Boogie Garcia MD PhD  7/9/2024  8:09 AM

## 2024-07-09 NOTE — PROGRESS NOTES
Medication Education     Medication education for Davon Troncoso was provided to the patient and family for the following medication(s):  Insulin glargine  Insulin lispro  Metformin  Cefdinir  Metronidazole       Medication education provided by a Pharmacist:  ADR Counseling Medication interactions Dose, frequency, storage Proper dose, indication, possible ADRs Benefits of taking the medication  Importance of compliance Any drug interactions (including OTCs and herbvals) and importance of notifying a healthcare provider of any medication changes    Identified potential barriers to education:  None    Method(s) of Education:  Verbal    An opportunity to ask questions and receive answers was provided.     Assessment of understanding the patient and family:  2= meets goals/outcomes    Additional Notes (if applicable):     Kuldeep Upton        3 feet ; unable to advance further due to poor balance and overall weakness

## 2024-07-09 NOTE — ED PROVIDER NOTES
HPI   Chief Complaint   Patient presents with    Groin Swelling     Pain started today, difficulty urinating     Dizziness    Shortness of Breath    Fatigue       HPI     HISTORY OF PRESENT ILLNESS:  Patient is a 43-year-old male history of diabetes, hypertension hyperlipidemia presents to the emergency department with right testicular pain.  Patient states that gradually started last evening.  It would radiate into his back, mid epigastric region, and chest.  He had associated lightheadedness.  The patient denied a dysuria, nausea, vomiting.  Patient not take any medications for pain.  The patient has been out of his medications for the last 3 years, including his metformin, cholesterol, hypertension medication.    Past Medical History: Diabetes, hypertension hyperlipidemia, prior staph infection  Past Surgical History: Left testicular surgery with prosthesis  Family History: family history not pertinent to presenting problem or chief complaint  Social History: Former smoker, currently chews    __________________________________________________________  PHYSICAL EXAM:    Appearance: Alert, oriented , cooperative   Skin: Intact,  dry skin, no lesions, rash, petechiae or purpura.   Eyes: PERRLA, EOMs intact,  Conjunctiva pink with no redness or exudates.    HENT: Normocephalic, atraumatic. Nares patent   Neck: Supple. Trachea at midline.   Pulmonary: Lung sounds are clear bilaterally.  There is no rales, rhonchi, or wheezing.  Cardiac: Regular rate and rhythm, no rubs, murmurs, or gallops. No JVD,   Abdomen: Abdomen is soft, nontender, and nondistended.  No palpable organomegaly.  No rebound or guarding.  No CVA tenderness. Nonsurgical abdomen  Genitourinary: Circumcised penis, right testicle minimally tender.  Prosthetic and left  Musculoskeletal: no edema, pain, cyanosis, or deformity in extremities. Pulses full and equal.   Neurological:  Cranial nerves are grossly intact, grossly normal sensation, no weakness, no  focal findings identified.    __________________________________________________________  MEDICAL DECISION MAKING:    Patient was seen and examined. Differential diagnosis for scrotal pain and abdominal pain includes aortic dissection, kidney stone, testicular torsion, atypical ACS..  Patient has been having 1 day of worsening pain.Patient currently has strong pulses.  Not hypertensive.  However, pain is radiating into the chest.  Will obtain CT angio in addition to ultrasound. Patient laboratory workup showed hyperglycemia, pseudohyponatremia, no anion gap concerning for diabetic ketoacidosis.  The patient had negative troponins.  Urinalysis showed glucose in the urine.  Ultrasound was negative for testicular pathology.  The patient's CT angio showed no aortic dissection or aneurysm.  There is evidence of duodenitis.  The patient had been provided 2 L of IV fluids with improvement of the glucose.  Patient has been off of his medications.  I did review the patient's endocrinology note from February 3, 2024 where is noted that the patient was on metformin, Lantus, and a insulin sliding scale.  Given that his last hemoglobin A1c was 11.9, I believe the patient would benefit from hospitalization to restart medications and to ensure correct medication supply and glucose control.  Did sit down and educate the patient and he was agreeable with the plan for admission.  Understood that glucose control was needed to prevent complications such as increased risk for stroke, cardiovascular disease which she was at risk for with his CT finding of increased atherosclerotic disease.  Case discussed with [facility] who agreed to have the patient admitted.    Chronic Medical Conditions Significantly Affecting Care: Diabetes, hypertension hyperlipidemia, prior staph infection    External Records Reviewed: I reviewed recent and relevant outside records including: Patient progress note from May 26-20 21    Ian Riley  Emergency Medicine                [place] Coma Scale Score: 15                     Patient History   Past Medical History:   Diagnosis Date    Personal history of other diseases of the circulatory system     History of hypertension    Personal history of other endocrine, nutritional and metabolic disease     History of hyperlipidemia    Personal history of other endocrine, nutritional and metabolic disease     History of type 2 diabetes mellitus     Past Surgical History:   Procedure Laterality Date    OTHER SURGICAL HISTORY  04/06/2017    Lung Lobectomy Partial     No family history on file.  Social History     Tobacco Use    Smoking status: Not on file    Smokeless tobacco: Not on file   Substance Use Topics    Alcohol use: Not on file    Drug use: Not on file       Physical Exam   ED Triage Vitals [07/08/24 2126]   Temperature Heart Rate Respirations BP   36.4 °C (97.5 °F) (!) 107 16 139/83      Pulse Ox Temp Source Heart Rate Source Patient Position   98 % Temporal -- --      BP Location FiO2 (%)     -- --       Physical Exam    ED Course & [facility]   ED Course as of 07/09/24 0307   Mon Jul 08, 2024   2230 Patient twelve-lead EKG interpreted by myself shows sinus rhythm, ventricular 98, normal CT interval, normal axis, normal QRS duration, normal QT, no STEMI. [WJ]   2242 LEUKOCYTES (10*3/UL) IN BLOOD BY AUTOMATED COUNT, [place]: 7.7 [WJ]   2337 GLUCOSE(!!): 474 [WJ]   2338 GLUCOSE(!!): 474  Patient is hyperglycemic, consistent with the patient's medication noncompliance.  No anion gap present concerning for diabetic ketoacidosis. [WJ]   Tue Jul 09, 2024   0011 US scrotum  No acute sonographic abnormality.      Avascular structure with low-level echoes in the expected location of the left testicle corresponding to  reported left testicular prosthesis.   [WJ]      ED Course User Index  [WJ] Ian Riley DO         Diagnoses as of 07/09/24 0307   Hyperglycemia   Duodenitis       Medical Decision Making      Procedure  Procedures      Ian Riley,   07/09/24 0312

## 2024-07-09 NOTE — NURSING NOTE
Patient given discharge instructions. Including print-outs on diabetes education and how to use an insulin pen. Patient was educated on how to administer insulin pen, and verbalized he understood and expressed he has used an insulin pen before. Patient's medications delivered by  retail pharmacy. IV removed. Patient is to be sent home with all belongings. All questions answered.

## 2024-07-09 NOTE — CARE PLAN
The patient's goals for the shift include  less pain     The clinical goals for the shift include patients pain will be controlled to <4/10 this shift.      Problem: Fall/Injury  Goal: Not fall by end of shift  Outcome: Progressing  Goal: Be free from injury by end of the shift  Outcome: Progressing  Goal: Verbalize understanding of personal risk factors for fall in the hospital  Outcome: Progressing  Goal: Verbalize understanding of risk factor reduction measures to prevent injury from fall in the home  Outcome: Progressing  Goal: Use assistive devices by end of the shift  Outcome: Progressing  Goal: Pace activities to prevent fatigue by end of the shift  Outcome: Progressing     Problem: Pain - Adult  Goal: Verbalizes/displays adequate comfort level or baseline comfort level  Outcome: Progressing     Problem: Safety - Adult  Goal: Free from fall injury  Outcome: Progressing     Problem: Discharge Planning  Goal: Discharge to home or other facility with appropriate resources  Outcome: Progressing     Problem: Chronic Conditions and Co-morbidities  Goal: Patient's chronic conditions and co-morbidity symptoms are monitored and maintained or improved  Outcome: Progressing     Problem: Diabetes  Goal: Achieve decreasing blood glucose levels by end of shift  Outcome: Progressing  Goal: Increase stability of blood glucose readings by end of shift  Outcome: Progressing  Goal: Decrease in ketones present in urine by end of shift  Outcome: Progressing  Goal: Maintain electrolyte levels within acceptable range throughout shift  Outcome: Progressing  Goal: Maintain glucose levels >70mg/dl to <250mg/dl throughout shift  Outcome: Progressing  Goal: No changes in neurological exam by end of shift  Outcome: Progressing  Goal: Learn about and adhere to nutrition recommendations by end of shift  Outcome: Progressing  Goal: Vital signs within normal range for age by end of shift  Outcome: Progressing  Goal: Increase self care and/or  family involovement by end of shift  Outcome: Progressing  Goal: Receive DSME education by end of shift  Outcome: Progressing     Problem: Nutrition  Goal: Less than 5 days NPO/clear liquids  Outcome: Progressing  Goal: Oral intake greater than 50%  Outcome: Progressing  Goal: Oral intake greater 75%  Outcome: Progressing  Goal: Consume prescribed supplement  Outcome: Progressing  Goal: Adequate PO fluid intake  Outcome: Progressing  Goal: Nutrition support goals are met within 48 hrs  Outcome: Progressing  Goal: Nutrition support is meeting 75% of nutrient needs  Outcome: Progressing  Goal: Tube feed tolerance  Outcome: Progressing  Goal: BG  mg/dL  Outcome: Progressing  Goal: Lab values WNL  Outcome: Progressing  Goal: Electrolytes WNL  Outcome: Progressing  Goal: Promote healing  Outcome: Progressing  Goal: Maintain stable weight  Outcome: Progressing  Goal: Reduce weight from edema/fluid  Outcome: Progressing  Goal: Gradual weight gain  Outcome: Progressing  Goal: Improve ostomy output  Outcome: Progressing

## 2024-07-09 NOTE — H&P
[facility] - GENERAL MEDICINE HISTORY AND PHYSICAL    History Obtained From: Pt    History Of Present Illness:  Davon Troncoso is a 43 y.o. male with PMHx s/f poorly controlled DM2, HTN, HLD, obesity, Partial lung lobectomy 2017, L testicular prosthesis presenting with R testicular pain. Pt states for the last 24 hours he has been having R stabbing testicular pain that has been radiating into his thoracic back and chest. He is having some lightheadedness as well. He says the pain is so severe he is having shortness of breath. At one point the pain made him almost fall. He does admit to recent urinary frequency, hesitancy, and burning over the last week. He used to follow with endocrinology for his DM2, but stopped his meds of his own accord roughly three years ago. He has not been taking any prescription medications since then. He is supposed to be on Humalog, Lantus, a statin, lisinopril, and metformin. No vomiting, cough, sputum production, fever, chills, syncope, or leg swelling. Overall he says his testicular pain has improved as his hyperglycemia has been treated in the ER and now he feels better.    ED Course (Summary):   Vitals on presentation: 97.5 F, 107 bpm, 16 rr, 139/83, 98% on RA  Labs: CMP glu 474, Na 128, Cl 93  Lipase 63  Trop 5 --> 5  INR 1.0  CBC unremarkable  UA 1+ protein, 4+ glucose  Imaging: US scrotum - No acute sonographic abnormality.   Avascular structure with low-level echoes in the expected location of   the left testicle corresponding to reported left testicular   prosthesis.   CTA c/a/p - No aortic dissection or aneurysm.   2. Mild wall thickening of the distal duodenum and proximal jejunum   concerning for duodenitis/enteritis. Mildly prominent left lower   quadrant mesenteric lymph nodes, likely reactive.   3. Mildly prominent bilateral hilar lymph nodes, likely reactive.   4. Ovoid 2.1 cm structure within the subcutaneous left scrotum,   possibly testicular  prosthesis, less likely epidermal inclusion cyst.   Scrotum is not completely included in the field of view.   5. Hepatomegaly.   6. Atherosclerosis (including multi-vessel coronary atherosclerotic   calcification), greater than expected for patient age.   EKG - Sinus rhythm at 98 bpm  Interventions: Pepcid 40 mg X1, LR 2 L bolus, morphine 4 mg X1, Zofran 4 mg X2, Admission for hyperglycemia    ED Course (From Provider):  ED Course as of 07/09/24 0346   Mon Jul 08, 2024   2230 Patient twelve-lead EKG interpreted by myself shows sinus rhythm, ventricular 98, normal MN interval, normal axis, normal QRS duration, normal QT, no STEMI. [WJ]   2242 LEUKOCYTES (10*3/UL) IN BLOOD BY AUTOMATED COUNT, Maced[place]: 7.7 [WJ]   2337 GLUCOSE(!!): 474 [WJ]   2338 GLUCOSE(!!): 474  Patient is hyperglycemic, consistent with the patient's medication noncompliance.  No anion gap present concerning for diabetic ketoacidosis. [WJ]   Tue Jul 09, 2024   0011 US scrotum  No acute sonographic abnormality.      Avascular structure with low-level echoes in the expected location of the left testicle corresponding to  reported left testicular prosthesis.   [WJ]      ED Course User Index  [WJ] Ian Riley DO         Diagnoses as of 07/09/24 0346   Hyperglycemia   Duodenitis     Relevant Results  Results for orders placed or performed during the hospital encounter of 07/08/24 (from the past 24 hour(s))   CBC and Auto Differential   Result Value Ref Range    WBC 7.7 4.4 - 11.3 x10*3/uL    nRBC 0.0 0.0 - 0.0 /100 WBCs    RBC 5.19 4.50 - 5.90 x10*6/uL    Hemoglobin 14.6 13.5 - 17.5 g/dL    Hematocrit 41.0 41.0 - 52.0 %    MCV 79 (L) 80 - 100 fL    MCH 28.1 26.0 - 34.0 pg    MCHC 35.6 32.0 - 36.0 g/dL    RDW 12.3 11.5 - 14.5 %    Platelets 342 150 - 450 x10*3/uL    Neutrophils % 73.7 40.0 - 80.0 %    Immature Granulocytes %, Automated 0.3 0.0 - 0.9 %    Lymphocytes % 19.5 13.0 - 44.0 %    Monocytes % 5.6 2.0 - 10.0 %    Eosinophils % 0.5 0.0 - 6.0 %     Basophils % 0.4 0.0 - 2.0 %    Neutrophils Absolute 5.70 1.20 - 7.70 x10*3/uL    Immature Granulocytes Absolute, Automated 0.02 0.00 - 0.70 x10*3/uL    Lymphocytes Absolute 1.51 1.20 - 4.80 x10*3/uL    Monocytes Absolute 0.43 0.10 - 1.00 x10*3/uL    Eosinophils Absolute 0.04 0.00 - 0.70 x10*3/uL    Basophils Absolute 0.03 0.00 - 0.10 x10*3/uL   Lipase   Result Value Ref Range    Lipase 63 9 - 82 U/L   Comprehensive metabolic panel   Result Value Ref Range    Glucose 474 (HH) 74 - 99 mg/dL    Sodium 128 (L) 136 - 145 mmol/L    Potassium 4.3 3.5 - 5.3 mmol/L    Chloride 93 (L) 98 - 107 mmol/L    Bicarbonate 26 21 - 32 mmol/L    Anion Gap 13 10 - 20 mmol/L    Urea Nitrogen 14 6 - 23 mg/dL    Creatinine 1.07 0.50 - 1.30 mg/dL    eGFR 88 >60 mL/min/1.73m*2    Calcium 9.1 8.6 - 10.3 mg/dL    Albumin 4.0 3.4 - 5.0 g/dL    Alkaline Phosphatase 112 33 - 120 U/L    Total Protein 6.6 6.4 - 8.2 g/dL    AST 9 9 - 39 U/L    Bilirubin, Total 0.4 0.0 - 1.2 mg/dL    ALT 11 10 - 52 U/L   Protime-INR   Result Value Ref Range    Protime 11.0 9.8 - 12.8 seconds    INR 1.0 0.9 - 1.1   Troponin I, High Sensitivity, Initial   Result Value Ref Range    Troponin I, High Sensitivity 5 0 - 20 ng/L   Urinalysis with Reflex Microscopic   Result Value Ref Range    Color, Urine Colorless (N) Light-Yellow, Yellow, Dark-Yellow    Appearance, Urine Clear Clear    Specific Gravity, Urine 1.024 1.005 - 1.035    pH, Urine 5.5 5.0, 5.5, 6.0, 6.5, 7.0, 7.5, 8.0    Protein, Urine 30 (1+) (A) NEGATIVE, 10 (TRACE), 20 (TRACE) mg/dL    Glucose, Urine OVER (4+) (A) Normal mg/dL    Blood, Urine NEGATIVE NEGATIVE    Ketones, Urine NEGATIVE NEGATIVE mg/dL    Bilirubin, Urine NEGATIVE NEGATIVE    Urobilinogen, Urine Normal Normal mg/dL    Nitrite, Urine NEGATIVE NEGATIVE    Leukocyte Esterase, Urine NEGATIVE NEGATIVE   Microscopic Only, Urine   Result Value Ref Range    WBC, Urine 1-5 1-5, NONE /HPF    RBC, Urine 1-2 NONE, 1-2, 3-5 /HPF   Troponin, High  Sensitivity, 1 Hour   Result Value Ref Range    Troponin I, High Sensitivity 5 0 - 20 ng/L   POCT GLUCOSE   Result Value Ref Range    POCT Glucose 375 (H) 74 - 99 mg/dL   POCT GLUCOSE   Result Value Ref Range    POCT Glucose 297 (H) 74 - 99 mg/dL   POCT GLUCOSE   Result Value Ref Range    POCT Glucose 283 (H) 74 - 99 mg/dL      CT angio chest abdomen pelvis    Result Date: 7/9/2024  Interpreted By:  Felix Castro, STUDY: CT ANGIO CHEST ABDOMEN PELVIS;  7/9/2024 12:42 am   INDICATION: Signs/Symptoms:R testicle pain, radiate to back and groin. + lightheaded. s/p L testicular prosthesis   COMPARISON: 08/31/2011   ACCESSION NUMBER(S): [id number]   ORDERING CLINICIAN: LOVE RAYMOND   TECHNIQUE: CT of the chest, abdomen, and pelvis was performed. Contiguous axial images were obtained through the chest, abdomen and pelvis. Coronal and sagittal reconstructions were performed. Intravenous contrast was administered, 100 mL Omnipaque 350. Noncontrast images were acquired followed by arterial phase images. MIP/3D reconstructions were performed on a separate workstation and provided for interpretation.   FINDINGS: CHEST:   LOWER NECK AND CHEST WALL:  Mild gynecomastia. Mild body wall edema.   MEDIASTINUM/OSVALDO: Mildly prominent bilateral hilar lymph nodes, likely reactive. Esophagus is unremarkable.   CARDIOVASCULAR:  Cardiac chamber size within normal limits. No pericardial effusion. Motion artifact limits evaluation of the aortic root. No aortic dissection or aneurysm detected. There is focal noncalcified atherosclerotic plaque at the proximal right subclavian and left carotid artery origins without substantial luminal stenosis. Normal caliber of main pulmonary artery. Multi-vessel coronary atherosclerotic calcification.   LUNGS, AIRWAYS, AND PLEURA:  Scattered subsegmental atelectasis/scarring. Scattered calcified granulomas. There is a 0.8 cm pulmonary nodule at the left lung apex, image 75/336, stable when compared  to 08/31/2011 compatible with a benign process.. The trachea and central airways are patent.   MUSCULOSKELETAL: No acute osseous abnormality or suspicious osseous lesions. Numerous healed left posterior rib fractures.       ABDOMEN:   LIVER: Hepatomegaly measuring 23 cm craniocaudal.   BILE DUCTS: Normal caliber.   GALLBLADDER: No calcified stones. No wall thickening.   PANCREAS: Mild diffuse fatty atrophy.   SPLEEN: Within normal limits.   ADRENALS: Within normal limits.   KIDNEYS, URETERS, and BLADDER: No hydronephrosis or renal calculi. Ureters are non-dilated. Urinary bladder within normal limits.   REPRODUCTIVE: Ovoid 2.1 cm structure within the subcutaneous left scrotum, possibly testicular prosthesis or epidermal inclusion cyst.   VESSELS: Mild calcified and noncalcified atherosclerotic plaque. Mild narrowing of the right common femoral artery secondary to atherosclerotic plaque with approximately 30% luminal stenosis. The celiac, superior mesenteric, renal, inferior mesenteric, iliac, and visualized proximal femoral arterial structures are patent.   RETROPERITONEUM and LYMPH NODES: Mildly prominent left lower quadrant mesenteric lymph nodes, likely reactive.   BOWEL: The stomach is unremarkable. There is mild wall thickening of the distal duodenum and proximal jejunum concerning for duodenitis/enteritis. Normal appendix. Large bowel is normal.   PERITONEUM: No ascites or free air. No fluid collection.   BODY WALL: Within normal limits.   MUSCULOSKELETAL: No acute osseous abnormality or suspicious osseous lesions. Mild bilateral hip osteoarthritis. Mild multilevel spinal degenerative change.         1. No aortic dissection or aneurysm. 2. Mild wall thickening of the distal duodenum and proximal jejunum concerning for duodenitis/enteritis. Mildly prominent left lower quadrant mesenteric lymph nodes, likely reactive. 3. Mildly prominent bilateral hilar lymph nodes, likely reactive. 4. Ovoid 2.1 cm structure  within the subcutaneous left scrotum, possibly testicular prosthesis, less likely epidermal inclusion cyst. Scrotum is not completely included in the field of view. 5. Hepatomegaly. 6. Atherosclerosis (including multi-vessel coronary atherosclerotic calcification), greater than expected for patient age.   Signed by: Felix Castro 7/9/2024 2:05 AM Dictation workstation:   [id number]    US scrotum    Result Date: 7/9/2024  Interpreted By:  Don Moran, STUDY: US SCROTUM;  7/8/2024 11:15 pm   INDICATION: Signs/Symptoms:R testicle pain, radiate to back and groin. + lightheaded. s/p L testicular prosthesis.   COMPARISON: None.   ACCESSION NUMBER(S): [id number]   ORDERING CLINICIAN: LOVE RAYMOND   TECHNIQUE: Multiple ultrasonographic images of scrotum and tested were obtained.   FINDINGS: RIGHT HEMISCROTUM:   RIGHT TESTICLE: The right testicle measures 3.5 cm x 2.7 cmx 5.8 cm. The right testicle demonstrates a homogeneous echotexture and normal contour. Normal vascularity and Doppler waveforms are observed in the right testicle.   RIGHT EPIDIDYMIS: The right epididymal head measures 0.9 cm x 0.5 cm x 3.7 cm and is within normal limits.   LEFT HEMISCROTUM:   LEFT TESTICLE: There is a superficial isoechoic structure with low-level echoes in the expected location of the left testicle. This structure demonstrates no vascular flow. The patient is reported to have a left testicular prosthesis.         No acute sonographic abnormality.   Avascular structure with low-level echoes in the expected location of the left testicle corresponding to reported left testicular prosthesis.   MACRO: None   Signed by: Don Moran 7/9/2024 12:02 AM Dictation workstation:   [id number]    Scheduled medications:  atorvastatin, 80 mg, oral, Nightly  insulin glargine, 20 Units, subcutaneous, Nightly  insulin lispro, 0-5 Units, subcutaneous, TID  lisinopril, 10 mg, oral, Daily  pantoprazole, 40 mg, oral, Daily before breakfast    Or  pantoprazole, 40 mg, intravenous, Daily before breakfast      Continuous medications:     PRN medications:  PRN medications: bisacodyl, bisacodyl, dextrose, dextrose, glucagon, glucagon, guaiFENesin, melatonin, ondansetron **OR** ondansetron     Past Medical History  He has a past medical history of Personal history of other diseases of the circulatory system, Personal history of other endocrine, nutritional and metabolic disease, and Personal history of other endocrine, nutritional and metabolic disease.    Surgical History  He has a past surgical history that includes Other surgical history (04/06/2017).     Social History  He reports that he has quit smoking. His smoking use included cigarettes. He has never used smokeless tobacco. He reports that he does not drink alcohol and does not use drugs.    Family History  No family history on file.    Allergies  Naproxen    Code Status  Full Code     Review of Systems   Constitutional:  Negative for activity change, appetite change, chills, diaphoresis, fatigue and fever.   HENT:  Negative for congestion, ear pain, rhinorrhea, sinus pain and sore throat.    Respiratory:  Positive for shortness of breath. Negative for apnea, cough, chest tightness, wheezing and stridor.    Cardiovascular:  Positive for chest pain. Negative for palpitations and leg swelling.   Gastrointestinal:  Negative for abdominal distention, abdominal pain, constipation, diarrhea, nausea and vomiting.   Genitourinary:  Positive for difficulty urinating, dysuria, frequency, testicular pain and urgency. Negative for enuresis, flank pain, genital sores, hematuria, penile discharge, penile pain, penile swelling and scrotal swelling.   Musculoskeletal:  Positive for back pain. Negative for arthralgias, gait problem, joint swelling and myalgias.   Skin:  Negative for color change, pallor, rash and wound.   Neurological:  Positive for light-headedness. Negative for dizziness, syncope, weakness, numbness  and headaches.   Psychiatric/Behavioral:  Negative for agitation, behavioral problems, confusion and decreased concentration. The patient is not nervous/anxious.    All other systems reviewed and are negative.      Last Recorded Vitals  BP (!) 126/95 (BP Location: Left arm, Patient Position: Lying)   Pulse 90   Temp 36.4 °C (97.5 °F) (Temporal)   Resp 16   Wt 98.4 kg (217 lb)   SpO2 100%      Physical Exam:  Vital signs and nursing notes reviewed.   Constitutional: Pleasant and cooperative. Laying in bed in no acute distress. Conversant.   Skin: Warm and dry; no obvious lesions, rashes, pallor, or jaundice. Good turgor.   Eyes: EOMI. Anicteric sclera.   ENT: Mucous membranes moist; no obvious injury or deformity appreciated.   Head and Neck: Normocephalic, atraumatic. ROM preserved. Trachea midline. No appreciable JVD.   Respiratory: Nonlabored on RA. Lungs clear to auscultation bilaterally without obvious adventitious sounds. Chest rise is equal.  Cardiovascular: RRR. No gross murmur, gallop, or rub. Extremities are warm and well-perfused with good capillary refill (< 3 seconds). No chest wall tenderness.   GI: Abdomen soft, nontender, nondistended. No obvious organomegaly appreciated. Bowel sounds are present and normoactive.  : No CVA tenderness. Genital examined with chaperone. No rash, swelling, or urethral discharge noted. L testicular prosthesis noted.  MSK: No gross abnormalities appreciated. No limitations to AROM/PROM appreciated.   Extremities: No cyanosis, edema, or clubbing evident. Neurovascularly intact.   Neuro: A&Ox3. CN 2-12 grossly intact. Able to respond to questions appropriately and clearly. No acute focal neurologic deficits appreciated.  Psych: Appropriate mood and behavior.    Assessment/Plan   Principal Problem:    Hyperglycemia  Active Problems:    Type 2 diabetes mellitus with diabetic neuropathy (Multi)    Benign essential hypertension    Hyperlipidemia    Tachycardia     Hyponatremia    Testicular pain    Duodenitis    Plan:  Admit to gen med    Uncontrolled DM2, hyperglycemia:  A1c ordered.  Endocrinology consulted.  Endo records reviewed from 2021. He was on Lantus 52 units daily, Humalog 40 units before meals, and also had a Humalog sliding scale.  Will start Lantus 20 units daily along with SSI for now and adjust as tolerated.    Testicular pain:  Unclear etiology, but appears to have improved with ER treatment.  Continue to monitor.  Possibly related to glucosuria?  Imaging negative for acute findings.    HTN: Restart lisinopril 10 mg    HLD: Restart Lipitor 80 mg    Duodenitis seen on CT a/p:  No GI symptoms currently. Continue to monitor.    Diet: Carb controlled  DVT Prophylaxis: None needed per guidelines  Code Status: Full code per discussion with pt.     Jonny Holder DO    Dragon dictation software was used to dictate this note and thus there may be minor errors in translation/transcription including garbled speech or misspellings. Please contact for clarification if needed.

## 2024-07-14 LAB
ATRIAL RATE: 99 BPM
P AXIS: 46 DEGREES
PR INTERVAL: 136 MS
Q ONSET: 249 MS
QRS COUNT: 15 BEATS
QRS DURATION: 88 MS
QT INTERVAL: 349 MS
QTC CALCULATION(BAZETT): 446 MS
QTC FREDERICIA: 411 MS
R AXIS: -17 DEGREES
T AXIS: 52 DEGREES
T OFFSET: 424 MS
VENTRICULAR RATE: 98 BPM

## 2024-08-26 ENCOUNTER — APPOINTMENT (OUTPATIENT)
Dept: ENDOCRINOLOGY | Facility: CLINIC | Age: 44
End: 2024-08-26
Payer: COMMERCIAL

## 2024-10-17 ENCOUNTER — HOSPITAL ENCOUNTER (EMERGENCY)
Facility: HOSPITAL | Age: 44
Discharge: LEFT BEFORE DISCHARGE | End: 2024-10-17
Payer: COMMERCIAL

## 2024-10-17 VITALS
HEIGHT: 74 IN | DIASTOLIC BLOOD PRESSURE: 64 MMHG | WEIGHT: 197 LBS | RESPIRATION RATE: 18 BRPM | OXYGEN SATURATION: 100 % | HEART RATE: 87 BPM | SYSTOLIC BLOOD PRESSURE: 97 MMHG | BODY MASS INDEX: 25.28 KG/M2 | TEMPERATURE: 97.9 F

## 2024-10-17 PROCEDURE — 4500999001 HC ED NO CHARGE

## 2024-10-17 ASSESSMENT — PAIN SCALES - GENERAL: PAINLEVEL_OUTOF10: 6

## 2024-10-17 ASSESSMENT — PAIN DESCRIPTION - FREQUENCY: FREQUENCY: CONSTANT/CONTINUOUS

## 2024-10-17 ASSESSMENT — PAIN DESCRIPTION - DESCRIPTORS: DESCRIPTORS: SHARP;SHOOTING

## 2024-10-17 ASSESSMENT — PAIN DESCRIPTION - ORIENTATION: ORIENTATION: LEFT;RIGHT

## 2024-10-17 ASSESSMENT — COLUMBIA-SUICIDE SEVERITY RATING SCALE - C-SSRS
1. IN THE PAST MONTH, HAVE YOU WISHED YOU WERE DEAD OR WISHED YOU COULD GO TO SLEEP AND NOT WAKE UP?: NO
2. HAVE YOU ACTUALLY HAD ANY THOUGHTS OF KILLING YOURSELF?: NO
6. HAVE YOU EVER DONE ANYTHING, STARTED TO DO ANYTHING, OR PREPARED TO DO ANYTHING TO END YOUR LIFE?: NO

## 2024-10-17 ASSESSMENT — PAIN DESCRIPTION - PAIN TYPE: TYPE: CHRONIC PAIN

## 2024-10-17 ASSESSMENT — PAIN DESCRIPTION - LOCATION: LOCATION: LEG

## 2024-10-17 ASSESSMENT — PAIN - FUNCTIONAL ASSESSMENT: PAIN_FUNCTIONAL_ASSESSMENT: 0-10
